# Patient Record
Sex: MALE | Race: BLACK OR AFRICAN AMERICAN | NOT HISPANIC OR LATINO | URBAN - METROPOLITAN AREA
[De-identification: names, ages, dates, MRNs, and addresses within clinical notes are randomized per-mention and may not be internally consistent; named-entity substitution may affect disease eponyms.]

---

## 2024-06-30 ENCOUNTER — HOSPITAL ENCOUNTER (EMERGENCY)
Facility: OTHER | Age: 44
Discharge: HOME | End: 2024-06-30
Attending: EMERGENCY MEDICINE

## 2024-06-30 VITALS
HEART RATE: 98 BPM | TEMPERATURE: 98 F | WEIGHT: 291 LBS | BODY MASS INDEX: 33.67 KG/M2 | SYSTOLIC BLOOD PRESSURE: 142 MMHG | DIASTOLIC BLOOD PRESSURE: 82 MMHG | RESPIRATION RATE: 18 BRPM | OXYGEN SATURATION: 100 % | HEIGHT: 78 IN

## 2024-06-30 DIAGNOSIS — S60.222A CONTUSION OF DORSUM OF LEFT HAND: Primary | ICD-10-CM

## 2024-06-30 DIAGNOSIS — M79.643 HAND PAIN: ICD-10-CM

## 2024-06-30 DIAGNOSIS — S60.512A ABRASION OF LEFT HAND, INITIAL ENCOUNTER: ICD-10-CM

## 2024-06-30 PROCEDURE — 25000003 PHARM REV CODE 250: Performed by: EMERGENCY MEDICINE

## 2024-06-30 PROCEDURE — 90715 TDAP VACCINE 7 YRS/> IM: CPT | Performed by: EMERGENCY MEDICINE

## 2024-06-30 PROCEDURE — 99284 EMERGENCY DEPT VISIT MOD MDM: CPT | Mod: 25

## 2024-06-30 PROCEDURE — 90471 IMMUNIZATION ADMIN: CPT | Performed by: EMERGENCY MEDICINE

## 2024-06-30 PROCEDURE — 63600175 PHARM REV CODE 636 W HCPCS: Performed by: EMERGENCY MEDICINE

## 2024-06-30 RX ORDER — KETOROLAC TROMETHAMINE 10 MG/1
10 TABLET, FILM COATED ORAL EVERY 8 HOURS PRN
Qty: 15 TABLET | Refills: 0 | Status: SHIPPED | OUTPATIENT
Start: 2024-06-30 | End: 2024-07-05

## 2024-06-30 RX ORDER — BACITRACIN ZINC 500 UNIT/G
OINTMENT (GRAM) TOPICAL 2 TIMES DAILY
Qty: 30 G | Refills: 0 | Status: SHIPPED | OUTPATIENT
Start: 2024-06-30 | End: 2024-07-07

## 2024-06-30 RX ORDER — KETOROLAC TROMETHAMINE 10 MG/1
10 TABLET, FILM COATED ORAL
Status: COMPLETED | OUTPATIENT
Start: 2024-06-30 | End: 2024-06-30

## 2024-06-30 RX ORDER — ACETAMINOPHEN 500 MG
1000 TABLET ORAL
Status: COMPLETED | OUTPATIENT
Start: 2024-06-30 | End: 2024-06-30

## 2024-06-30 RX ORDER — AMOXICILLIN AND CLAVULANATE POTASSIUM 875; 125 MG/1; MG/1
1 TABLET, FILM COATED ORAL 2 TIMES DAILY
Qty: 10 TABLET | Refills: 0 | Status: SHIPPED | OUTPATIENT
Start: 2024-06-30 | End: 2024-07-05

## 2024-06-30 RX ADMIN — KETOROLAC TROMETHAMINE 10 MG: 10 TABLET, FILM COATED ORAL at 04:06

## 2024-06-30 RX ADMIN — TETANUS TOXOID, REDUCED DIPHTHERIA TOXOID AND ACELLULAR PERTUSSIS VACCINE, ADSORBED 0.5 ML: 5; 2.5; 8; 8; 2.5 SUSPENSION INTRAMUSCULAR at 04:06

## 2024-06-30 RX ADMIN — BACITRACIN ZINC, NEOMYCIN, POLYMYXIN B 1 EACH: 400; 3.5; 5 OINTMENT TOPICAL at 04:06

## 2024-06-30 RX ADMIN — ACETAMINOPHEN 1000 MG: 500 TABLET ORAL at 04:06

## 2024-06-30 NOTE — ED NOTES
Left hand open area cleaned with NS, antibiotic ungt and dressing applied, ace wrap to left hand, voiced relief of pain
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
## 2024-10-05 ENCOUNTER — HOSPITAL ENCOUNTER (EMERGENCY)
Facility: OTHER | Age: 44
Discharge: HOME | End: 2024-10-05
Attending: EMERGENCY MEDICINE

## 2024-10-05 VITALS
HEIGHT: 78 IN | WEIGHT: 283 LBS | BODY MASS INDEX: 32.74 KG/M2 | HEART RATE: 65 BPM | RESPIRATION RATE: 16 BRPM | SYSTOLIC BLOOD PRESSURE: 133 MMHG | TEMPERATURE: 98 F | OXYGEN SATURATION: 97 % | DIASTOLIC BLOOD PRESSURE: 87 MMHG

## 2024-10-05 DIAGNOSIS — S46.811A STRAIN OF RIGHT TRAPEZIUS MUSCLE, INITIAL ENCOUNTER: ICD-10-CM

## 2024-10-05 DIAGNOSIS — M25.511 RIGHT SHOULDER PAIN: ICD-10-CM

## 2024-10-05 DIAGNOSIS — M25.511 ACUTE PAIN OF RIGHT SHOULDER: Primary | ICD-10-CM

## 2024-10-05 DIAGNOSIS — S43.401A SPRAIN OF RIGHT SHOULDER, UNSPECIFIED SHOULDER SPRAIN TYPE, INITIAL ENCOUNTER: ICD-10-CM

## 2024-10-05 PROCEDURE — 63600175 PHARM REV CODE 636 W HCPCS: Performed by: EMERGENCY MEDICINE

## 2024-10-05 PROCEDURE — 25000003 PHARM REV CODE 250: Performed by: EMERGENCY MEDICINE

## 2024-10-05 PROCEDURE — 96372 THER/PROPH/DIAG INJ SC/IM: CPT | Performed by: EMERGENCY MEDICINE

## 2024-10-05 PROCEDURE — 99284 EMERGENCY DEPT VISIT MOD MDM: CPT | Mod: 25

## 2024-10-05 RX ORDER — KETOROLAC TROMETHAMINE 10 MG/1
10 TABLET, FILM COATED ORAL EVERY 6 HOURS PRN
Qty: 20 TABLET | Refills: 0 | Status: SHIPPED | OUTPATIENT
Start: 2024-10-05 | End: 2024-10-05

## 2024-10-05 RX ORDER — CYCLOBENZAPRINE HCL 10 MG
10 TABLET ORAL
Status: COMPLETED | OUTPATIENT
Start: 2024-10-05 | End: 2024-10-05

## 2024-10-05 RX ORDER — KETOROLAC TROMETHAMINE 30 MG/ML
60 INJECTION, SOLUTION INTRAMUSCULAR; INTRAVENOUS
Status: COMPLETED | OUTPATIENT
Start: 2024-10-05 | End: 2024-10-05

## 2024-10-05 RX ORDER — DEXAMETHASONE SODIUM PHOSPHATE 4 MG/ML
8 INJECTION, SOLUTION INTRA-ARTICULAR; INTRALESIONAL; INTRAMUSCULAR; INTRAVENOUS; SOFT TISSUE
Status: COMPLETED | OUTPATIENT
Start: 2024-10-05 | End: 2024-10-05

## 2024-10-05 RX ORDER — KETOROLAC TROMETHAMINE 10 MG/1
10 TABLET, FILM COATED ORAL EVERY 6 HOURS PRN
Qty: 20 TABLET | Refills: 0 | Status: SHIPPED | OUTPATIENT
Start: 2024-10-05

## 2024-10-05 RX ORDER — CYCLOBENZAPRINE HCL 10 MG
10 TABLET ORAL 3 TIMES DAILY PRN
Qty: 30 TABLET | Refills: 0 | Status: SHIPPED | OUTPATIENT
Start: 2024-10-05 | End: 2024-10-05

## 2024-10-05 RX ORDER — ACETAMINOPHEN 500 MG
1000 TABLET ORAL
Status: COMPLETED | OUTPATIENT
Start: 2024-10-05 | End: 2024-10-05

## 2024-10-05 RX ORDER — LIDOCAINE 50 MG/G
1 PATCH TOPICAL
Status: DISCONTINUED | OUTPATIENT
Start: 2024-10-05 | End: 2024-10-05 | Stop reason: HOSPADM

## 2024-10-05 RX ORDER — CYCLOBENZAPRINE HCL 10 MG
10 TABLET ORAL 3 TIMES DAILY PRN
Qty: 30 TABLET | Refills: 0 | Status: SHIPPED | OUTPATIENT
Start: 2024-10-05 | End: 2024-10-15

## 2024-10-05 RX ORDER — LIDOCAINE 50 MG/G
1 PATCH TOPICAL DAILY PRN
Qty: 14 PATCH | Refills: 2 | Status: SHIPPED | OUTPATIENT
Start: 2024-10-05

## 2024-10-05 RX ORDER — LIDOCAINE 50 MG/G
1 PATCH TOPICAL DAILY PRN
Qty: 14 PATCH | Refills: 2 | Status: SHIPPED | OUTPATIENT
Start: 2024-10-05 | End: 2024-10-05

## 2024-10-05 RX ADMIN — KETOROLAC TROMETHAMINE 60 MG: 30 INJECTION, SOLUTION INTRAMUSCULAR; INTRAVENOUS at 04:10

## 2024-10-05 RX ADMIN — ACETAMINOPHEN 1000 MG: 500 TABLET, FILM COATED ORAL at 04:10

## 2024-10-05 RX ADMIN — LIDOCAINE 1 PATCH: 50 PATCH CUTANEOUS at 04:10

## 2024-10-05 RX ADMIN — CYCLOBENZAPRINE HYDROCHLORIDE 10 MG: 10 TABLET, FILM COATED ORAL at 04:10

## 2024-10-05 RX ADMIN — DEXAMETHASONE SODIUM PHOSPHATE 8 MG: 4 INJECTION, SOLUTION INTRA-ARTICULAR; INTRALESIONAL; INTRAMUSCULAR; INTRAVENOUS; SOFT TISSUE at 04:10

## 2024-10-05 NOTE — ED PROVIDER NOTES
Encounter Date: 10/5/2024       History     Chief Complaint   Patient presents with    Shoulder Pain     C/o right shoulder pain x 2 months, Denies injury     44-year-old male presents via wife to [facility] ER with acute aching diffuse posterior right shoulder pain radiating to fingers and into neck ongoing for the last 1.5 months.  Patient states he woke up with pain one morning.  He has not taken any pills for pain.  Wife applied icy-hot and heating pad and patient had some relief.  No numbness or weakness.  No chest pain or shortness of breath.  Right-handed.    Patient denies particular injury, but he works in construction (builds furniture), and lifts weights frequently.  Patient also boxed for many years, though he has not boxed in 2-3 years.      No past medical history.  No allergies.        Review of patient's allergies indicates:  No Known Allergies  History reviewed. No pertinent past medical history.  No past surgical history on file.  No family history on file.     Review of Systems   Constitutional:  Negative for fever.   HENT:  Negative for sore throat.    Eyes:  Negative for photophobia.   Respiratory:  Negative for shortness of breath.    Cardiovascular:  Negative for chest pain.   Gastrointestinal:  Negative for abdominal pain.   Genitourinary:  Negative for dysuria.   Musculoskeletal:  Positive for myalgias (right posterior shoulder).   Skin:  Negative for rash.   Neurological:  Negative for syncope.       Physical Exam     Initial Vitals [10/05/24 0116]   BP Pulse Resp Temp SpO2   (!) 143/69 86 20 98 °F (36.7 °C) 98 %      MAP       --         Physical Exam    Nursing note and vitals reviewed.  Constitutional: He appears well-developed and well-nourished. He is not diaphoretic.   Awake, alert, nontoxic, speaking in complete sentences.    HENT:   Head: Normocephalic and atraumatic. Mouth/Throat: Oropharynx is clear and moist.   Eyes: Conjunctivae and EOM are normal. Pupils are equal, round,  and reactive to light.   Neck: Neck supple.   Normal range of motion.  Cardiovascular:  Normal rate, regular rhythm and intact distal pulses.           Pulmonary/Chest: Breath sounds normal. No respiratory distress. He has no wheezes. He has no rhonchi. He has no rales.   Abdominal: He exhibits no distension.   Musculoskeletal:         General: Tenderness present. No edema. Normal range of motion.      Cervical back: Normal range of motion and neck supple.      Comments: Diffuse right upper back tenderness.  Full active range of motion of right shoulder.  No intra-articular tenderness.  No effusion.     Neurological: He is alert and oriented to person, place, and time. He has normal strength.   Moving all extremities   Skin: Skin is warm and dry.   Psychiatric: His behavior is normal.         ED Course   Procedures  Labs Reviewed - No data to display       Imaging Results              X-Ray Shoulder Trauma Right (Final result)  Result time 10/05/24 01:39:30      Final result by Brian Hassan MD (10/05/24 01:39:30)                   Impression:      No acute osseous abnormality identified.      Electronically signed by: Brian Hassan MD  Date:    10/05/2024  Time:    01:39               Narrative:    EXAMINATION:  XR SHOULDER TRAUMA 3 VIEW RIGHT    CLINICAL HISTORY:  Pain in right shoulder    TECHNIQUE:  Three views of the right shoulder were performed.    COMPARISON:  None    FINDINGS:  No evidence of acute displaced fracture, dislocation, or osseous destructive process.                                       Medications   ketorolac injection 60 mg (60 mg Intramuscular Given 10/5/24 0426)   cyclobenzaprine tablet 10 mg (10 mg Oral Given 10/5/24 0425)   acetaminophen tablet 1,000 mg (1,000 mg Oral Given 10/5/24 0425)   dexAMETHasone injection 8 mg (8 mg Intramuscular Given 10/5/24 0426)     Medical Decision Making  44-year-old male with right posterior shoulder/upper back pain ongoing for the last 1.5 months.   No trauma; however patient is physically active at both work and at leisure.      Differential includes muscle sprain or spasm, occult fracture, ACS, other.    No chest pain or shortness of breath to suggest ACS.  Patient has been able to continue physical activity despite discomfort.  Pain is also reproducible to exam, suggesting thoracic wall origin.  I suspect trapezius muscle strain given location.    Patient received IM ketorolac 60mg and IM dexamethasone 8mg, as well as PO flexeril 10mg and PO APAP 1g, and topical lidocaine 5% while in ER.    D/c'ed with PRN flexeril, toradol, and lidoderm.  (I sent these to [facility] and [facility] as patient is uninsured and paying out of pocket.)    D/c'ed.      Amount and/or Complexity of Data Reviewed  Radiology: ordered.    Risk  OTC drugs.  Prescription drug management.                                      Clinical Impression:  Final diagnoses:  [M25.511] Right shoulder pain  [M25.511] Acute pain of right shoulder (Primary)  [S43.401A] Sprain of right shoulder, unspecified shoulder sprain type, initial encounter  [S46.811A] Strain of right trapezius muscle, initial encounter          ED Disposition Condition    Discharge Stable          ED Prescriptions       Medication Sig Dispense Start Date End Date Auth. Provider    LIDOcaine (LIDODERM) 5 %  (Status: Discontinued) Place 1 patch onto the skin daily as needed (right shoulder sprain). Remove & Discard patch within 12 hours or as directed by MD 14 patch 10/5/2024 10/5/2024 Apryl Mcwilliams MD    cyclobenzaprine (FLEXERIL) 10 MG tablet  (Status: Discontinued) Take 1 tablet (10 mg total) by mouth 3 (three) times daily as needed for Muscle spasms. 30 tablet 10/5/2024 10/5/2024 Apryl Mcwilliams MD    ketorolac (TORADOL) 10 mg tablet  (Status: Discontinued) Take 1 tablet (10 mg total) by mouth every 6 (six) hours as needed for Pain. 20 tablet 10/5/2024 10/5/2024 Apryl Mcwilliams MD    cyclobenzaprine (FLEXERIL)  10 MG tablet Take 1 tablet (10 mg total) by mouth 3 (three) times daily as needed for Muscle spasms. 30 tablet 10/5/2024 10/15/2024 Apryl Mcwilliams MD    ketorolac (TORADOL) 10 mg tablet Take 1 tablet (10 mg total) by mouth every 6 (six) hours as needed for Pain. 20 tablet 10/5/2024 -- Apryl Mcwilliams MD    LIDOcaine (LIDODERM) 5 % Place 1 patch onto the skin daily as needed (right shoulder sprain). Remove & Discard patch within 12 hours or as directed by MD 14 patch 10/5/2024 -- Apryl Mcwilliams MD          Follow-up Information       Follow up With Specialties Details Why Contact Info    [facility]   As needed [address] [facility] [address]  [phone]               Apryl Mcwilliams MD  10/06/24 2464

## 2024-12-31 ENCOUNTER — HOSPITAL ENCOUNTER (EMERGENCY)
Facility: OTHER | Age: 44
Discharge: HOME | End: 2024-12-31
Attending: EMERGENCY MEDICINE

## 2024-12-31 VITALS
WEIGHT: 281 LBS | RESPIRATION RATE: 20 BRPM | SYSTOLIC BLOOD PRESSURE: 147 MMHG | HEART RATE: 98 BPM | OXYGEN SATURATION: 100 % | TEMPERATURE: 98 F | DIASTOLIC BLOOD PRESSURE: 97 MMHG | HEIGHT: 78 IN | BODY MASS INDEX: 32.51 KG/M2

## 2024-12-31 DIAGNOSIS — M54.2 NECK PAIN: ICD-10-CM

## 2024-12-31 DIAGNOSIS — S46.811D TRAPEZIUS MUSCLE STRAIN, RIGHT, SUBSEQUENT ENCOUNTER: Primary | ICD-10-CM

## 2024-12-31 LAB — POCT GLUCOSE: 98 MG/DL (ref 70–110)

## 2024-12-31 PROCEDURE — 99284 EMERGENCY DEPT VISIT MOD MDM: CPT | Mod: 25

## 2024-12-31 PROCEDURE — 63600175 PHARM REV CODE 636 W HCPCS: Performed by: EMERGENCY MEDICINE

## 2024-12-31 PROCEDURE — 82962 GLUCOSE BLOOD TEST: CPT

## 2024-12-31 PROCEDURE — 96372 THER/PROPH/DIAG INJ SC/IM: CPT | Performed by: EMERGENCY MEDICINE

## 2024-12-31 PROCEDURE — 25000003 PHARM REV CODE 250: Performed by: EMERGENCY MEDICINE

## 2024-12-31 RX ORDER — CYCLOBENZAPRINE HCL 10 MG
10 TABLET ORAL EVERY 12 HOURS PRN
Qty: 25 TABLET | Refills: 0 | Status: SHIPPED | OUTPATIENT
Start: 2024-12-31 | End: 2025-01-13

## 2024-12-31 RX ORDER — LIDOCAINE 50 MG/G
1 PATCH TOPICAL
Status: DISCONTINUED | OUTPATIENT
Start: 2024-12-31 | End: 2025-01-01 | Stop reason: HOSPADM

## 2024-12-31 RX ORDER — LIDOCAINE 50 MG/G
1 PATCH TOPICAL DAILY PRN
Qty: 14 PATCH | Refills: 0 | Status: SHIPPED | OUTPATIENT
Start: 2024-12-31

## 2024-12-31 RX ORDER — KETOROLAC TROMETHAMINE 30 MG/ML
15 INJECTION, SOLUTION INTRAMUSCULAR; INTRAVENOUS
Status: COMPLETED | OUTPATIENT
Start: 2024-12-31 | End: 2024-12-31

## 2024-12-31 RX ORDER — DEXAMETHASONE 4 MG/1
8 TABLET ORAL
Status: COMPLETED | OUTPATIENT
Start: 2024-12-31 | End: 2024-12-31

## 2024-12-31 RX ORDER — IBUPROFEN 600 MG/1
600 TABLET ORAL EVERY 8 HOURS PRN
Qty: 30 TABLET | Refills: 0 | Status: SHIPPED | OUTPATIENT
Start: 2024-12-31

## 2024-12-31 RX ORDER — ORPHENADRINE CITRATE 30 MG/ML
60 INJECTION INTRAMUSCULAR; INTRAVENOUS
Status: COMPLETED | OUTPATIENT
Start: 2024-12-31 | End: 2024-12-31

## 2024-12-31 RX ADMIN — KETOROLAC TROMETHAMINE 15 MG: 30 INJECTION, SOLUTION INTRAMUSCULAR; INTRAVENOUS at 09:12

## 2024-12-31 RX ADMIN — DEXAMETHASONE 8 MG: 4 TABLET ORAL at 11:12

## 2024-12-31 RX ADMIN — ORPHENADRINE CITRATE 60 MG: 60 INJECTION INTRAMUSCULAR; INTRAVENOUS at 09:12

## 2024-12-31 RX ADMIN — LIDOCAINE 1 PATCH: 50 PATCH CUTANEOUS at 11:12

## 2025-01-01 NOTE — ED TRIAGE NOTES
Pt presents to ED today c/o right shoulder pain   Reports he was previously prescribed pain medication, patches, and steroids with temporary relief  Intermittent episodes of numbness to arm

## 2025-01-01 NOTE — ED PROVIDER NOTES
"Encounter Date: 12/31/2024    SCRIBE #1 NOTE: I, Rachelle Mirza, am scribing for, and in the presence of,  Elias Herron MD. I have scribed the following portions of the note - Other sections scribed: HPI, ROS, PE.       History     Chief Complaint   Patient presents with    Shoulder Pain     R neck and shoulder pain began in Sept, was prescribed lidocaine patches and given steroid injections. States pain is now continuous and getting worse, radiates down length of spine. States holding arm in certain position slightly relieves pain.     Neck Pain     Time seen by provider: 9:54 PM    This is a 44 y.o. male with no pertinent medical history who presents with complaint of shoulder pain that radiates up to his neck and down to his hand. He states that the pain has progressively worsened over the past week. He describes the shoulder pain as a "knot" and it worsens with movement. He also reports associated numbness and tingling in his fingers. He states that he was seen here in October and given a shot for his pain. He also states that he was prescribed some pain medication during his last visit but he ran out a few weeks ago and now the pain is back. He denies any trauma to the area or heavy lifting. He notes that he has not taken any pain medications. He denies any sick contact. He does not smoke, drink, or use illicit drugs. He notes that he got blood work done 9 months ago and his labs were normal. This is the extent of the patient's complaints at this time.    The history is provided by the patient. No  was used.     Review of patient's allergies indicates:  No Known Allergies  History reviewed. No pertinent past medical history.  History reviewed. No pertinent surgical history.  No family history on file.  Social History     Tobacco Use    Smoking status: Unknown     Review of Systems   Constitutional:  Negative for fever.   HENT:  Negative for congestion.    Eyes:  Negative for redness. "   Respiratory:  Negative for shortness of breath.    Cardiovascular:  Negative for chest pain.   Gastrointestinal:  Negative for abdominal pain.   Genitourinary:  Negative for dysuria.   Musculoskeletal:  Positive for myalgias and neck pain.   Skin:  Negative for rash.   Neurological:  Negative for headaches.   Psychiatric/Behavioral:  Negative for confusion.        Physical Exam     Initial Vitals [12/31/24 2029]   BP Pulse Resp Temp SpO2   132/71 104 16 97.8 °F (36.6 °C) 95 %      MAP       --         Physical Exam    Nursing note and vitals reviewed.  Constitutional: He appears well-developed and well-nourished. He is not diaphoretic. No distress.   HENT:   Head: Normocephalic and atraumatic.   Eyes: Conjunctivae are normal. No scleral icterus.   Neck: Neck supple.   Tenderness to trapezius muscle and lateral neck   Cardiovascular:  Normal rate, regular rhythm, normal heart sounds and intact distal pulses.           No murmur heard.  Pulmonary/Chest: Breath sounds normal. No respiratory distress. He has no wheezes. He has no rhonchi. He has no rales.   Abdominal: Abdomen is soft. There is no abdominal tenderness. There is no rebound and no guarding.   Musculoskeletal:         General: No edema.      Cervical back: Neck supple.     Neurological: He is alert and oriented to person, place, and time.   Skin: Skin is warm and dry.   Psychiatric: He has a normal mood and affect.         ED Course   Procedures  Labs Reviewed   POCT GLUCOSE       Result Value    POCT Glucose 98            Imaging Results    None          Medications   ketorolac injection 15 mg (15 mg Intramuscular Given 12/31/24 2154)   orphenadrine injection 60 mg (60 mg Intramuscular Given 12/31/24 2154)   dexAMETHasone tablet 8 mg (8 mg Oral Given 12/31/24 2326)     Medical Decision Making      44-year-old male with no known comorbidities presents for evaluation of persistent right-sided shoulder/neck pain.  Initial onset a few months ago, he denies  any known trauma forward, was seen here in the ED about 3 weeks ago and started on Toradol and Flexeril pills which he took with improvement.  However since running out of the pills a few weeks ago pain has recurred, described as a knot in his right upper back radiating to his lateral neck and down his right arm with associated episodic numbness of his 3rd through 5th fingers.  He denies any arm weakness or chest pain, no other complaints.  He does do a lot of lifting as part of his job but denies any trauma or clear inciting event prior to onset symptoms.  On exam patient with tenderness to right trapezius muscle with palpable spasm extending to lateral neck.  He is currently neuro intact with no bone tenderness or other concerning exam findings.  Differential diagnosis includes trapezius muscle strain/spasm, cervical radiculopathy.  Patient had labs earlier this year with no abnormalities or new onset diabetes, no indication for repeat now.      Patient was given IM Toradol and Norflex, and on reassessment he feels much better.  Still no neuro deficits or indication for emergent MRI.  Fingerstick checked and normal.  Will restart NSAIDs and Flexeril for suspected trapezius strain and spasm, patient comfortable with this treatment plan, will also give a dose of Decadron now since this was effective unless ED visit and Rx lidocaine patches 2.  He is advised on need for outpatient follow up with either PCP or pain management, and return precautions for any persistent right hand numbness or weakness.      Amount and/or Complexity of Data Reviewed  External Data Reviewed: notes.  Labs: ordered. Decision-making details documented in ED Course.    Risk  Prescription drug management.            Scribe Attestation:   Scribe #1: I performed the above scribed service and the documentation accurately describes the services I performed. I attest to the accuracy of the note.              I, Dr. Elias Herron, personally  performed the services described in this documentation. All medical record entries made by the scribe were at my direction and in my presence.  I have reviewed the chart and agree that the record reflects my personal performance and is accurate and complete. Elias Herron MD.                      Clinical Impression:  Final diagnoses:  [S46.871D] Trapezius muscle strain, right, subsequent encounter (Primary)  [M54.2] Neck pain          ED Disposition Condition    Discharge Stable          ED Prescriptions       Medication Sig Dispense Start Date End Date Auth. Provider    LIDOcaine (LIDODERM) 5 % Place 1 patch onto the skin daily as needed (right shoulder sprain). Remove & Discard patch within 12 hours or as directed by MD 14 patch 12/31/2024 -- Elias Herron MD    cyclobenzaprine (FLEXERIL) 10 MG tablet Take 1 tablet (10 mg total) by mouth every 12 (twelve) hours as needed for Muscle spasms. 25 tablet 12/31/2024 1/13/2025 Elias Herron MD    ibuprofen (ADVIL,MOTRIN) 600 MG tablet Take 1 tablet (600 mg total) by mouth every 8 (eight) hours as needed for Pain. 30 tablet 12/31/2024 -- Elias Herron MD          Follow-up Information       Follow up With Specialties Details Why Contact Info Additional Information    [facility] Emergency Dept Emergency Medicine Go to  If symptoms worsen [address] [id number] [phone]     [facility] Pain Medicine Schedule an appointment as soon as possible for a visit in 1 week  [address] [id number] [phone] Turn at Entrance 1 on [facility] in [facility] and take elevators to Floor 2. Follow signs to [facility]. Take [facility] [address] to Floor [address] for [address].             Elias Herron MD  01/01/25 7957

## 2025-07-22 ENCOUNTER — HOSPITAL ENCOUNTER (EMERGENCY)
Facility: OTHER | Age: 45
Discharge: HOME | End: 2025-07-22
Attending: STUDENT IN AN ORGANIZED HEALTH CARE EDUCATION/TRAINING PROGRAM
Payer: COMMERCIAL

## 2025-07-22 VITALS
BODY MASS INDEX: 32.51 KG/M2 | HEART RATE: 88 BPM | TEMPERATURE: 98 F | OXYGEN SATURATION: 99 % | HEIGHT: 78 IN | RESPIRATION RATE: 16 BRPM | SYSTOLIC BLOOD PRESSURE: 106 MMHG | WEIGHT: 281 LBS | DIASTOLIC BLOOD PRESSURE: 62 MMHG

## 2025-07-22 DIAGNOSIS — H61.22 IMPACTED CERUMEN OF LEFT EAR: Primary | ICD-10-CM

## 2025-07-22 PROCEDURE — 69209 REMOVE IMPACTED EAR WAX UNI: CPT | Mod: LT

## 2025-07-22 PROCEDURE — 99281 EMR DPT VST MAYX REQ PHY/QHP: CPT | Mod: 25

## 2025-07-23 NOTE — DISCHARGE INSTRUCTIONS

## 2025-07-23 NOTE — ED PROVIDER NOTES
Encounter Date: 7/22/2025       History     Chief Complaint   Patient presents with    Otalgia     Pt presents with L ear pain that began last night after feeling something fly in. Pt is unsure if something is still in his ear but reports increased pain and swelling since this morning. Fever denied. +muffled hearing     Patient is a 44-year-old with history of prior impacted ear wax who presents with decreased hearing from his left ear.  He states that last night he felt like something flew into his ear.  He woke up this morning with muffled hearing.  He reports general discomfort to the left ear and is concerned that his ear is swollen.  Denies fevers, chills, N/V/D.  He used to have his ears cleaned by his primary care physician into joint but he no longer has a primary care doctor here.    The history is provided by the patient. No  was used.     Review of patient's allergies indicates:  No Known Allergies  No past medical history on file.  No past surgical history on file.  No family history on file.  Social History[1]  Review of Systems   Constitutional:  Negative for fever.   HENT:  Negative for sore throat.    Respiratory:  Negative for shortness of breath.    Cardiovascular:  Negative for chest pain.   Gastrointestinal:  Negative for nausea.   Genitourinary:  Negative for dysuria.   Musculoskeletal:  Negative for back pain.   Skin:  Negative for rash.   Neurological:  Negative for weakness.       Physical Exam     Initial Vitals [07/22/25 2231]   BP Pulse Resp Temp SpO2   106/62 88 16 97.9 °F (36.6 °C) 99 %      MAP       --         Physical Exam    Constitutional: Vital signs are normal. No distress.   HENT:   Head: Normocephalic and atraumatic.   Right Ear: Tympanic membrane normal. No swelling.   Left Ear: Tympanic membrane normal. No swelling. Decreased hearing is noted.   Eyes: EOM are normal.   Neck:   Normal range of motion.  Cardiovascular:  Normal rate, regular rhythm and intact  distal pulses.           Pulmonary/Chest: Breath sounds normal. No respiratory distress.   Abdominal: Abdomen is soft. There is no abdominal tenderness.   Musculoskeletal:         General: No edema. Normal range of motion.      Cervical back: Normal range of motion.     Neurological: He is alert and oriented to person, place, and time. GCS score is 15. GCS eye subscore is 4. GCS verbal subscore is 5. GCS motor subscore is 6.   Skin: Skin is warm and dry.         ED Course   Ear Wax Removal    Date/Time: 7/22/2025 10:58 PM    Performed by: Kathy Gomez MD  Authorized by: Kathy Gomez MD    Anesthesia:  Local Anesthetic: none  Location details: left ear  Procedure type: irrigation Cerumen Removal Results: Cerumen completely removed.  Patient tolerance: Patient tolerated the procedure well with no immediate complications        Labs Reviewed - No data to display       Imaging Results    None          Medications - No data to display  Medical Decision Making  Fernando Cordero is a 44 y.o. male with h/o impacted ear wax who presents to the ED with decreased hearing for the left ear    Initial vitals reassuring. Physical exam reveals a pleasant, well-appearing man with significant ear wax in his left ear.     Irrigated patient's ear canal was saline, patient's symptoms resolved after ear wax removal.  Able to visualize TM after ear wax removal and it did not show any signs of infection or perforation.    Patient's symptoms resolved, his hearing is back to baseline.  No concern for otitis media, otitis externa, tympanic membrane perforation.  Patient will be discharged.  I have placed referral for family practice to establish primary care.  All questions answered.  Return precautions given.  Patient and his wife at the bedside are in agreement with the plan.                                          Clinical Impression:  Final diagnoses:  [H61.22] Impacted cerumen of left ear (Primary)          ED  Disposition Condition    Discharge Stable          ED Prescriptions    None       Follow-up Information    None       Launch MDCalc MDM  MDCalc MDM Module  Jul 22 2025 11:00 PM [Kathy Gomez]  Data:  - Test/documents/historian: 2 tests reviewed  2 external notes reviewed  Additional encounter diagnoses: Impacted cerumen of left ear  Risk: ear cerumen removal (Decision regarding minor surgery)             [1]   Social History  Tobacco Use    Smoking status: Unknown        Kathy Gomez MD  07/22/25 5729

## 2025-07-23 NOTE — ED TRIAGE NOTES
"Pt presents to ED c/o left ear pain that began last night. Pt states "he felt something fly into his ear and is unsure if it came out." Endorses pain and swelling to left ear. Endorses hearing change. Denies fever or any discharge from ear. Aaox4, NAD noted.   "